# Patient Record
Sex: MALE | Race: OTHER | HISPANIC OR LATINO | URBAN - METROPOLITAN AREA
[De-identification: names, ages, dates, MRNs, and addresses within clinical notes are randomized per-mention and may not be internally consistent; named-entity substitution may affect disease eponyms.]

---

## 2018-07-18 ENCOUNTER — INPATIENT (INPATIENT)
Facility: HOSPITAL | Age: 23
LOS: 1 days | Discharge: HOME | DRG: 645 | End: 2018-07-20
Attending: INTERNAL MEDICINE | Admitting: HOSPITALIST
Payer: MEDICAID

## 2018-07-18 VITALS
OXYGEN SATURATION: 97 % | SYSTOLIC BLOOD PRESSURE: 124 MMHG | HEART RATE: 128 BPM | TEMPERATURE: 99 F | DIASTOLIC BLOOD PRESSURE: 69 MMHG | RESPIRATION RATE: 19 BRPM

## 2018-07-18 DIAGNOSIS — R00.0 TACHYCARDIA, UNSPECIFIED: ICD-10-CM

## 2018-07-18 LAB
ALBUMIN SERPL ELPH-MCNC: 4.4 G/DL — SIGNIFICANT CHANGE UP (ref 3.3–5)
ALP SERPL-CCNC: 98 U/L — SIGNIFICANT CHANGE UP (ref 40–120)
ALT FLD-CCNC: 27 U/L — SIGNIFICANT CHANGE UP (ref 10–45)
ANION GAP SERPL CALC-SCNC: 14 MMOL/L — SIGNIFICANT CHANGE UP (ref 5–17)
AST SERPL-CCNC: 34 U/L — SIGNIFICANT CHANGE UP (ref 10–40)
BASOPHILS # BLD AUTO: 0 K/UL — SIGNIFICANT CHANGE UP (ref 0–0.2)
BASOPHILS NFR BLD AUTO: 0.4 % — SIGNIFICANT CHANGE UP (ref 0–2)
BILIRUB SERPL-MCNC: 0.5 MG/DL — SIGNIFICANT CHANGE UP (ref 0.2–1.2)
BUN SERPL-MCNC: 8 MG/DL — SIGNIFICANT CHANGE UP (ref 7–23)
CALCIUM SERPL-MCNC: 10.1 MG/DL — SIGNIFICANT CHANGE UP (ref 8.4–10.5)
CHLORIDE SERPL-SCNC: 102 MMOL/L — SIGNIFICANT CHANGE UP (ref 96–108)
CO2 SERPL-SCNC: 24 MMOL/L — SIGNIFICANT CHANGE UP (ref 22–31)
CREAT SERPL-MCNC: 0.72 MG/DL — SIGNIFICANT CHANGE UP (ref 0.5–1.3)
EOSINOPHIL # BLD AUTO: 0.1 K/UL — SIGNIFICANT CHANGE UP (ref 0–0.5)
EOSINOPHIL NFR BLD AUTO: 1.3 % — SIGNIFICANT CHANGE UP (ref 0–6)
GLUCOSE SERPL-MCNC: 100 MG/DL — HIGH (ref 70–99)
HCT VFR BLD CALC: 43.8 % — SIGNIFICANT CHANGE UP (ref 39–50)
HGB BLD-MCNC: 15 G/DL — SIGNIFICANT CHANGE UP (ref 13–17)
LYMPHOCYTES # BLD AUTO: 2.6 K/UL — SIGNIFICANT CHANGE UP (ref 1–3.3)
LYMPHOCYTES # BLD AUTO: 37.5 % — SIGNIFICANT CHANGE UP (ref 13–44)
MCHC RBC-ENTMCNC: 28.8 PG — SIGNIFICANT CHANGE UP (ref 27–34)
MCHC RBC-ENTMCNC: 34.3 GM/DL — SIGNIFICANT CHANGE UP (ref 32–36)
MCV RBC AUTO: 84 FL — SIGNIFICANT CHANGE UP (ref 80–100)
MONOCYTES # BLD AUTO: 0.5 K/UL — SIGNIFICANT CHANGE UP (ref 0–0.9)
MONOCYTES NFR BLD AUTO: 7.5 % — SIGNIFICANT CHANGE UP (ref 2–14)
NEUTROPHILS # BLD AUTO: 3.8 K/UL — SIGNIFICANT CHANGE UP (ref 1.8–7.4)
NEUTROPHILS NFR BLD AUTO: 53.4 % — SIGNIFICANT CHANGE UP (ref 43–77)
PLAT MORPH BLD: NORMAL — SIGNIFICANT CHANGE UP
PLATELET # BLD AUTO: 281 K/UL — SIGNIFICANT CHANGE UP (ref 150–400)
POTASSIUM SERPL-MCNC: 4.5 MMOL/L — SIGNIFICANT CHANGE UP (ref 3.5–5.3)
POTASSIUM SERPL-SCNC: 4.5 MMOL/L — SIGNIFICANT CHANGE UP (ref 3.5–5.3)
PROT SERPL-MCNC: 7.7 G/DL — SIGNIFICANT CHANGE UP (ref 6–8.3)
RBC # BLD: 5.21 M/UL — SIGNIFICANT CHANGE UP (ref 4.2–5.8)
RBC # FLD: 11.6 % — SIGNIFICANT CHANGE UP (ref 10.3–14.5)
RBC BLD AUTO: SIGNIFICANT CHANGE UP
SODIUM SERPL-SCNC: 140 MMOL/L — SIGNIFICANT CHANGE UP (ref 135–145)
TROPONIN T, HIGH SENSITIVITY RESULT: <6 NG/L — SIGNIFICANT CHANGE UP (ref 0–51)
WBC # BLD: 7.1 K/UL — SIGNIFICANT CHANGE UP (ref 3.8–10.5)
WBC # FLD AUTO: 7.1 K/UL — SIGNIFICANT CHANGE UP (ref 3.8–10.5)

## 2018-07-18 PROCEDURE — 99223 1ST HOSP IP/OBS HIGH 75: CPT

## 2018-07-18 PROCEDURE — 71046 X-RAY EXAM CHEST 2 VIEWS: CPT | Mod: 26

## 2018-07-18 PROCEDURE — 93010 ELECTROCARDIOGRAM REPORT: CPT

## 2018-07-18 PROCEDURE — 99285 EMERGENCY DEPT VISIT HI MDM: CPT | Mod: 25

## 2018-07-18 RX ORDER — SODIUM CHLORIDE 9 MG/ML
1000 INJECTION INTRAMUSCULAR; INTRAVENOUS; SUBCUTANEOUS ONCE
Qty: 0 | Refills: 0 | Status: COMPLETED | OUTPATIENT
Start: 2018-07-18 | End: 2018-07-18

## 2018-07-18 RX ORDER — ATENOLOL 25 MG/1
25 TABLET ORAL ONCE
Qty: 0 | Refills: 0 | Status: COMPLETED | OUTPATIENT
Start: 2018-07-18 | End: 2018-07-18

## 2018-07-18 RX ADMIN — SODIUM CHLORIDE 2000 MILLILITER(S): 9 INJECTION INTRAMUSCULAR; INTRAVENOUS; SUBCUTANEOUS at 17:30

## 2018-07-18 RX ADMIN — ATENOLOL 25 MILLIGRAM(S): 25 TABLET ORAL at 17:30

## 2018-07-18 RX ADMIN — SODIUM CHLORIDE 2000 MILLILITER(S): 9 INJECTION INTRAMUSCULAR; INTRAVENOUS; SUBCUTANEOUS at 14:45

## 2018-07-18 NOTE — H&P ADULT - ASSESSMENT
This patient is a 23yoM with no significant past medical history who presents to the ED with complaint of elevated heart rate and palpitations. Patient states that he has experienced intermittent palpitations with elevated heart rate and cramping chest pain since December 2017. He also complained of sweats, heat and cold intolerance, diarrhea, and R eye pain and throat discomfort. Patient had been hospitalized at [facility] in Jan 2018 and was told he had hyperthyroidism. He was hospitalized again 2.5 weeks prior to admission at [facility]. Patient states that he was discharged on medications, which he continued to take at home, without improvement in his symptoms. He also endorses 35 lbs of unintentional weight loss since Jan 2018. Patient decided to visit ED today because he woke up with palpitations and chest pain. He had an episode of nonbloody, nonbilious emesis today. He missed  his visit with his PMD today.     In the ED, T 98.6, , /69, RR 19, SpO2 97%RA  Patient was given 2L IVNS, atenolol 25mg PO x1. This patient is a 23yoM with no significant past medical history who presents to the ED with complaint of elevated heart rate and palpitations, likely 2/2 hyperthyroidism.

## 2018-07-18 NOTE — ED ADULT NURSE REASSESSMENT NOTE - NS ED NURSE REASSESS COMMENT FT1
report received from prior RN. pt resting, appears comfortable, states he feels better than upon arrival, still has some "fluttering" feeling in his chest. aware of plan for CDU. friend at bedside. VSS. cardiac monitor not on, reattached, sinus tachycardia noted.
pt remains tachycardiac, MD aware. awaiting CDU consult.

## 2018-07-18 NOTE — H&P ADULT - HISTORY OF PRESENT ILLNESS
In the ED, T 98.6, , /69, RR 19, SpO2 97%RA  Patient was given 2L IVNS, atenolol 25mg PO x1. This patient is a 23yoM with no significant past medical history who presents to the ED with complaint of elevated heart rate and palpitations. Patient states that he has experienced intermittent palpitations with elevated heart rate and cramping chest pain since December 2017. He also complained of sweats, heat and cold intolerance, diarrhea, and R eye pain and throat discomfort. Patient had been hospitalized at [facility] in Jan 2018 and was told he had hyperthyroidism. He was hospitalized again 2.5 weeks prior to admission at [facility]. Patient states that he was discharged on medications, which he continued to take at home, without improvement in his symptoms. He also endorses 35 lbs of unintentional weight loss since Jan 2018. Patient decided to visit ED today because he woke up with palpitations and chest pain. He had an episode of nonbloody, nonbilious emesis today. He missed  his visit with his PMD today.     In the ED, T 98.6, , /69, RR 19, SpO2 97%RA  Patient was given 2L IVNS, atenolol 25mg PO x1. This patient is a 23yoM with no significant past medical history who presents to the ED with complaint of elevated heart rate and palpitations. Patient states that he has experienced intermittent palpitations with elevated heart rate and cramping chest pain since December 2017. He also complained of sweats, heat and cold intolerance, diarrhea, and R eye pain and throat discomfort. Patient had been hospitalized at [facility] in Jan 2018 and was told he had hyperthyroidism. He was hospitalized again 2.5 weeks prior to admission at [facility]. Patient states that he was discharged on medications, which he continued to take at home, without improvement in his symptoms. He also endorses 35 lbs of unintentional weight loss since Jan 2018. Patient decided to visit ED today because he woke up with palpitations and chest pain. He had an episode of nonbloody, nonbilious emesis today. He missed  his visit with his PMD today.     In the ED, T 98.6, , /69, RR 19, SpO2 97%RA  Patient was given 2L IVNS, atenolol 25mg PO x1.   Per  ED note, patient was diagnosed with hyperthyroidism at [facility]. This patient is a 23yoM with no significant past medical history who presents to the ED with complaint of elevated heart rate and palpitations. Patient states that he has experienced intermittent palpitations with elevated heart rate and cramping chest pain since December 2017. He also complained of sweats, heat and cold intolerance, diarrhea, and R eye pain and throat discomfort. Patient had been hospitalized at [facility] in Jan 2018 and was told he had hyperthyroidism. He was hospitalized again 2.5 weeks prior to admission at [facility]. Patient states that he was discharged on medications, which he continued to take at home, without improvement in his symptoms. He also endorses 35 lbs of unintentional weight loss since Jan 2018. Patient decided to visit ED today because he woke up with palpitations and chest pain. He had an episode of nonbloody, nonbilious emesis today. He missed  his visit with his PMD today.     In the ED, T 98.6, , /69, RR 19, SpO2 97%RA  Patient was given 2L IVNS, atenolol 25mg PO x1.   Per  ED note, patient was diagnosed with hyperthyroidism at [facility]. ED note also states that patient had recently been admitted to rehab for codeine/promethazine use however he denies illicit drug use on my encounter.

## 2018-07-18 NOTE — H&P ADULT - NSHPREVIEWOFSYSTEMS_GEN_ALL_CORE
REVIEW OF SYSTEMS  CONSTITUTIONAL: No fever, + chills, + fatigue  EYES: No eye pain, R eye pain and sensation of foreign body  ENMT:  No difficulty hearing, + throat discomfort  RESPIRATORY: + mild cough, no wheezing, no sputum production; No shortness of breath  CARDIOVASCULAR: + chest pain, + palpitations, no leg swelling  GASTROINTESTINAL: No abdominal pain, + nausea, + vomiting, no hematemesis, + diarrhea, no constipation,  no hematochezia, + black stool  GENITOURINARY: No dysuria, no hematuria  NEUROLOGICAL: No headaches, no loss of strength, no numbness  SKIN: No itching, no rashes, no lesions   PSYCH: complains of anxiety  HEME/LYMPH: No easy bruising, bleeding

## 2018-07-18 NOTE — ED PROVIDER NOTE - NOTES
Paged Paged. Will f/u if admitted. May need to titrate beta blocker, +/- methimazole. Will see the patient this week.

## 2018-07-18 NOTE — H&P ADULT - NSHPLABSRESULTS_GEN_ALL_CORE
Labs and imaging personally reviewed by me.     LABS:                        15.0   7.1   )-----------( 281      ( 18 Jul 2018 14:46 )             43.8     Hgb Trend: 15.0<--  07-18    140  |  102  |  8   ----------------------------<  100<H>  4.5   |  24  |  0.72    Ca    10.1      18 Jul 2018 14:46    TPro  7.7  /  Alb  4.4  /  TBili  0.5  /  DBili  x   /  AST  34  /  ALT  27  /  AlkPhos  98  07-18    Creatinine Trend: 0.72<--    EKG:  Labs notable for CBC WNL. CMP is WNL, with slightly elevated glucose of 100. Serum CK is elevated at 800. Troponin T is < 6. Urine drug toxicology screen is negative.    Imaging: CXR found clear lungs bilaterally

## 2018-07-18 NOTE — H&P ADULT - PROBLEM SELECTOR PLAN 2
Patient endorses palpitations, diarrhea, sweats, and heat and cold intolerance, which are symptoms consistent with hyperthyroidism  - Will check TSH, free T4, T3, thyroid receptor antibody, anti-TPO  radioactive thyroid uptake   - start propranolol  - start Patient endorses palpitations, diarrhea, sweats, and heat and cold intolerance, which are symptoms consistent with hyperthyroidism  - Will check TSH, free T4, T3, thyroid receptor antibody, anti-TPO  radioactive thyroid uptake   - continue atenolol and uptitrate to 50mg PO qd , with hold parameters  - start This patient endorses chest pain. His initial troponin value was < 6, and CK was elevated at 800.   This patient has a HEART score of 2 based on history of smoking and chest pain.   Will repeat cardiac enzyme, however low suspicion of cardiac cause of chest pain, given chronicity and duration of discomfort and lack of association with exertion.

## 2018-07-18 NOTE — ED PROVIDER NOTE - PROGRESS NOTE DETAILS
MD Olga Lidia,Attending: after multiple calls--found that pt admitted to [facility] for Hyperthyroidism. Will rx beta blockers here and have recechk by PMD Friday--we spoke with her as well. DC with Rx for metoprolol. Will talk with and refer pt to endosrine team Endo made aware. Rec'd Metoprolol 12.5 BID. Will see the patient in the morning. Order Thyroid panel. Will determine Methamizole in AM. MD Olga Lidia,Attending: refused by CDU because of diarrhea --will admit to Medicine service

## 2018-07-18 NOTE — H&P ADULT - PROBLEM SELECTOR PLAN 3
- Medication reconciliation required in AM since patient is unable to state medications that he takes. He notes that the medications were prescribed at [facility]. Patient complains of having black stool and diarrhea  Patient has normal H/H and denies have BRBPR.  Will check stool guaiac.

## 2018-07-18 NOTE — ED PROVIDER NOTE - CARE PLAN
Principal Discharge DX:	Sinus tachycardia by electrocardiogram  Secondary Diagnosis:	Palpitations Principal Discharge DX:	Sinus tachycardia by electrocardiogram  Secondary Diagnosis:	Palpitations  Secondary Diagnosis:	Hyperthyroidism

## 2018-07-18 NOTE — ED CDU PROVIDER INITIAL DAY NOTE - MEDICAL DECISION MAKING DETAILS
MD Olga Lidia,Attending: pt seen. agree with above HPI/ROS/PE. just  discharge for rehab and medically cleared --developed palpitations and noted to be tachycardic per EMS. Sinus Tachycardic 120-130. for cardiac workup and IVFs--no PE or ACS risk factors. TRy to obtain records form recent visit to [facility] where told he had a "heart condition" and prescribed medication which he states was withheld at rehab. Report obtained later in visit form [place]--pt admitted for Hyperthyroidism--unknown if/what medications started. When UDS cmae back negative-pt styarted on po atenolol and Endocrine called. They requested admission to CDU for monitoring response to atenolol/await TSH results and will consult in am wrt to starting anti-Thyroid meds.

## 2018-07-18 NOTE — H&P ADULT - NSHPSOCIALHISTORY_GEN_ALL_CORE
Patient lives with his parents.   He denies use of alcohol or illicit drugs.  He states that he uses cigarettes, and vaping daily.

## 2018-07-18 NOTE — ED PROVIDER NOTE - MEDICAL DECISION MAKING DETAILS
MD Olga Lidia,Attending: pt seen. agree with above HPI/ROS/PE. just  discharge for rehab and medically cleared --developed palpitations and noted to be tachycardic per EMS. Sinus Tachycardic . for cardiac workup and IVFs--no PE or ACS risk factors. Likely DC after eval for OP followup MD Olga Lidia,Attending: pt seen. agree with above HPI/ROS/PE. just  discharge for rehab and medically cleared --developed palpitations and noted to be tachycardic per EMS. Sinus Tachycardic 120-130. for cardiac workup and IVFs--no PE or ACS risk factors. TRy to obtain records form recent visit to [facility] where told he had a "heart condition" and prescribed medication which he states was withheld at rehab. Likely DC after eval for OP followup MD Olga Lidia,Attending: pt seen. agree with above HPI/ROS/PE. just  discharge for rehab and medically cleared --developed palpitations and noted to be tachycardic per EMS. Sinus Tachycardic 120-130. for cardiac workup and IVFs--no PE or ACS risk factors. TRy to obtain records form recent visit to [facility] where told he had a "heart condition" and prescribed medication which he states was withheld at rehab. Report obtained later in visit form [place]--pt admitted for Hyperthyroidism--unknown if/what medications started. When UDS cmae back negative-pt styarted on po atenolol and Endocrine called. They requested admission to CDU for monitoring response to atenolol/await TSH results and will consult in am wrt to starting anti-Thyroid meds.

## 2018-07-18 NOTE — H&P ADULT - NSHPPHYSICALEXAM_GEN_ALL_CORE
T(C): 37 (07-18-18 @ 14:04), Max: 37 (07-18-18 @ 14:04)  HR: 111 (07-18-18 @ 22:54) (108 - 128)  BP: 113/57 (07-18-18 @ 22:54) (104/67 - 124/69)  RR: 16 (07-18-18 @ 22:54) (14 - 19)  SpO2: 98% (07-18-18 @ 22:54) (97% - 100%)  Wt(kg): --Vital Signs Last 24 Hrs  T(C): 37 (18 Jul 2018 14:04), Max: 37 (18 Jul 2018 14:04)  T(F): 98.6 (18 Jul 2018 14:04), Max: 98.6 (18 Jul 2018 14:04)  HR: 111 (18 Jul 2018 22:54) (108 - 128)  BP: 113/57 (18 Jul 2018 22:54) (104/67 - 124/69)  BP(mean): --  RR: 16 (18 Jul 2018 22:54) (14 - 19)  SpO2: 98% (18 Jul 2018 22:54) (97% - 100%)    PHYSICAL EXAM:  GENERAL: NAD, well-groomed, well-developed  HEAD:  Atraumatic, Normocephalic  EYES: EOMI, PERRLA, conjunctiva and sclera clear  ENMT: No oropharyngeal exudates, erythema or lesions,  Moist mucous membranes, good dentition,   NECK: Supple, no cervical lymphadenopathy, enlarged thyroid  NERVOUS SYSTEM:  Alert & Oriented X3, CN II-XII intact, 5/5 BUE and BLE motor strength, full sensation to light touch   CHEST/LUNG: Clear to percussion bilaterally; No rales, no  rhonchi, no wheezing  HEART: elevated heart rate, normal rhythm; No murmurs, rubs, or gallops  ABDOMEN: Soft, Nontender, Nondistended  EXTREMITIES:  2+ Peripheral Pulses, No clubbing, cyanosis, or edema  LYMPH: No lymphadenopathy noted  SKIN: multiple tattoos, R big toe nail ingrown, without exudate or erythema  PSYCH: calm, pleasant, appropriate thought process

## 2018-07-18 NOTE — ED ADULT NURSE NOTE - OBJECTIVE STATEMENT
22 y/o male with PMH April presenting to ED for palpitations x 1 week. 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 Pt was at rehab for 2 weeks when he started experiencing palpiatations and was then transported to [facility] for an evaluation but no one knows the outcome."   Pt states "The doctors gave me a medication for my fast heart rate but I don't remember what it's called. I have not been taking it. It feels like a cramp on the middle of my chest. It can get a little better if I lay down. The cramping feeling goes to my leg." Pt unable to give history/ outcome from [facility] visit. Upon exam, pt. A&Ox3, lungs cta bilaterally, no difficulty speaking in complete sentences, s1s2 heart sounds heard, skin intact, placed on cardiac monitor sinu tachy in the 120s. EKG done and givent to MD. pt. denies chest pain, sob, ha, n/v/d, abdominal pain, f/c, urinary symptoms, hematuria. Labs drawn & sent.

## 2018-07-18 NOTE — H&P ADULT - PROBLEM SELECTOR PLAN 4
- IMPROVE score of 0  Encourage ambulation as tolerated - Medication reconciliation required in AM since patient is unable to state medications that he takes. He notes that the medications were prescribed at [facility].

## 2018-07-19 DIAGNOSIS — K92.1 MELENA: ICD-10-CM

## 2018-07-19 DIAGNOSIS — E05.90 THYROTOXICOSIS, UNSPECIFIED WITHOUT THYROTOXIC CRISIS OR STORM: ICD-10-CM

## 2018-07-19 DIAGNOSIS — R07.9 CHEST PAIN, UNSPECIFIED: ICD-10-CM

## 2018-07-19 DIAGNOSIS — R00.0 TACHYCARDIA, UNSPECIFIED: ICD-10-CM

## 2018-07-19 DIAGNOSIS — R00.2 PALPITATIONS: ICD-10-CM

## 2018-07-19 DIAGNOSIS — Z29.9 ENCOUNTER FOR PROPHYLACTIC MEASURES, UNSPECIFIED: ICD-10-CM

## 2018-07-19 DIAGNOSIS — Z79.899 OTHER LONG TERM (CURRENT) DRUG THERAPY: ICD-10-CM

## 2018-07-19 LAB
T3 SERPL-MCNC: 354 NG/DL — HIGH (ref 80–200)
T4 FREE SERPL-MCNC: 3 NG/DL — HIGH (ref 0.9–1.8)
THYROPEROXIDASE AB SERPL-ACNC: 738 IU/ML — HIGH (ref 0–34.9)
TROPONIN T, HIGH SENSITIVITY RESULT: <6 NG/L — SIGNIFICANT CHANGE UP (ref 0–51)
TSH SERPL-MCNC: 0.01 UIU/ML — LOW (ref 0.27–4.2)
TSH SERPL-MCNC: 0.01 UIU/ML — LOW (ref 0.27–4.2)

## 2018-07-19 PROCEDURE — 99222 1ST HOSP IP/OBS MODERATE 55: CPT | Mod: GC

## 2018-07-19 PROCEDURE — 93010 ELECTROCARDIOGRAM REPORT: CPT

## 2018-07-19 RX ORDER — ATENOLOL 25 MG/1
50 TABLET ORAL DAILY
Qty: 0 | Refills: 0 | Status: DISCONTINUED | OUTPATIENT
Start: 2018-07-19 | End: 2018-07-19

## 2018-07-19 RX ORDER — ACETAMINOPHEN 500 MG
650 TABLET ORAL ONCE
Qty: 0 | Refills: 0 | Status: COMPLETED | OUTPATIENT
Start: 2018-07-19 | End: 2018-07-19

## 2018-07-19 RX ORDER — METOPROLOL TARTRATE 50 MG
12.5 TABLET ORAL EVERY 12 HOURS
Qty: 0 | Refills: 0 | Status: DISCONTINUED | OUTPATIENT
Start: 2018-07-19 | End: 2018-07-20

## 2018-07-19 RX ADMIN — Medication 650 MILLIGRAM(S): at 22:27

## 2018-07-19 RX ADMIN — Medication 650 MILLIGRAM(S): at 23:10

## 2018-07-19 RX ADMIN — Medication 12.5 MILLIGRAM(S): at 18:21

## 2018-07-19 RX ADMIN — Medication 12.5 MILLIGRAM(S): at 01:38

## 2018-07-19 NOTE — PROGRESS NOTE ADULT - SUBJECTIVE AND OBJECTIVE BOX
Patient is a 23y old  Male who presents with a chief complaint of fast heart rate and palpitations (18 Jul 2018 23:38)      SUBJECTIVE / OVERNIGHT EVENTS:   Feels better.  Denies CP/SOB/Palpitation/HA.    MEDICATIONS  (STANDING):  ibuprofen  Tablet 400 milliGRAM(s) Oral once  methimazole 20 milliGRAM(s) Oral daily  metoprolol tartrate 12.5 milliGRAM(s) Oral every 12 hours    MEDICATIONS  (PRN):        CAPILLARY BLOOD GLUCOSE        I&O's Summary    18 Jul 2018 07:01  -  19 Jul 2018 07:00  --------------------------------------------------------  IN: 240 mL / OUT: 0 mL / NET: 240 mL    19 Jul 2018 07:01  -  20 Jul 2018 00:10  --------------------------------------------------------  IN: 480 mL / OUT: 0 mL / NET: 480 mL        PHYSICAL EXAM:  GENERAL: NAD, well-developed  HEAD:  Atraumatic, Normocephalic  NECK: Supple, No JVD  CHEST/LUNG: Clear to auscultation bilaterally; No wheezing.  HEART: Regular rate and rhythm; No murmurs, rubs, or gallops  ABDOMEN: Soft, Nontender, Nondistended; Bowel sounds present  EXTREMITIES:   No clubbing, cyanosis, or edema  NEUROLOGY: AAO X 3  SKIN: No rashes    LABS:                        15.0   7.1   )-----------( 281      ( 18 Jul 2018 14:46 )             43.8     07-18    140  |  102  |  8   ----------------------------<  100<H>  4.5   |  24  |  0.72    Ca    10.1      18 Jul 2018 14:46    TPro  7.7  /  Alb  4.4  /  TBili  0.5  /  DBili  x   /  AST  34  /  ALT  27  /  AlkPhos  98  07-18      CARDIAC MARKERS ( 18 Jul 2018 14:46 )  x     / x     / 800 U/L / x     / x            CAPILLARY BLOOD GLUCOSE                    RADIOLOGY & ADDITIONAL TESTS:    Imaging Personally Reviewed:    Consultant(s) Notes Reviewed:      Care Discussed with Consultants/Other Providers:

## 2018-07-19 NOTE — PROVIDER CONTACT NOTE (OTHER) - ASSESSMENT
Patient A&Ox4, VSS. Patient is tachycardic 100-110 on cardiac monitor. /71, oral temperature 98.3 degrees F, RR 17, O2 saturation 97% on room air. Patient complaining of left sided chest pain 8/10 cramping. Patient states pain has been constant over past 2 weeks. Patient states patient feeling mild palpitations. Patient states palpitations are constant and increase in intensity when heart rate rises.

## 2018-07-19 NOTE — CONSULT NOTE ADULT - PROBLEM SELECTOR RECOMMENDATION 9
-Patient with suppressed TSH, clinically hyperthyroid. Has been having symptoms for past few months.  -as per prelim recs- started on metoprolol 12.5 BID. Would uptitrate for goal HR 70s-90s.  -f/u on FT4, TSH receptor ab, and TSI. As per hx pt likely has Grave's disease, less likely thyroiditis.  -obtain US thyroid  -patient already ordered for radioactive uptake scan -Patient with suppressed TSH, clinically hyperthyroid. Has been having symptoms for past few months.  -as per prelim recs- started on metoprolol 12.5 BID. Would uptitrate for goal HR 70s-90s.  -f/u on FT4, TSH receptor ab, and TSI. As per hx pt likely has Grave's disease, less likely thyroiditis.  -obtain US thyroid  -patient already ordered for radioactive uptake scan. Would hold off on methimazole for now until uptake scan. -Patient with suppressed TSH, clinically hyperthyroid. Has been having symptoms for past few months.  -as per prelim recs- started on metoprolol 12.5 BID. Would uptitrate for goal HR 70s-90s.  -f/u on FT4, TSH receptor ab, and TSI. As per hx pt likely has Grave's disease, less likely thyroiditis.  -obtain US thyroid  -patient already ordered for radioactive uptake scan, but if not already performed will likely not happen until next week as the test needs to be done 2 consecutive days. Uptake and scan can be done as an outpatient. Start methimazole 20mg daily.

## 2018-07-19 NOTE — CHART NOTE - NSCHARTNOTEFT_GEN_A_CORE
MEDICINE NP    Notified by RN patient with chest pain 6/10. Seen and examined patient at bedside. Patient is alert, NAD. Patient endorses this is same chest pain he came with on admission and was worse yesterday, now requesting pain medication. Pain does not radiate. Denies HA, CP, SOB, cough, N/V, or abd pain.    VITAL SIGNS:  Vital Signs Last 24 Hrs  T(C): 37.4 (19 Jul 2018 20:44), Max: 37.4 (19 Jul 2018 20:44)  T(F): 99.4 (19 Jul 2018 20:44), Max: 99.4 (19 Jul 2018 20:44)  HR: 104 (19 Jul 2018 20:44) (72 - 120)  BP: 131/76 (19 Jul 2018 20:44) (105/61 - 140/71)  RR: 15 (19 Jul 2018 20:44) (15 - 18)  SpO2: 98% (19 Jul 2018 20:44) (97% - 98%)      LABORATORY:                          15.0   7.1   )-----------( 281      ( 18 Jul 2018 14:46 )             43.8       07-18    140  |  102  |  8   ----------------------------<  100<H>  4.5   |  24  |  0.72    Ca    10.1      18 Jul 2018 14:46    TPro  7.7  /  Alb  4.4  /  TBili  0.5  /  DBili  x   /  AST  34  /  ALT  27  /  AlkPhos  98  07-18      RADIOLOGY:  7/18 CXR- negative    PHYSICAL EXAM:  Constitutional: AOx3. NAD.  Respiratory: clear lungs bilaterally. No wheezing, rhonchi, or crackles.  Cardiovascular: S1 S2. No murmurs. mild tenderness to palpation   Gastrointestinal: BS X4 active. soft. nontender.  Extremities/Vascular: +2 pulses bilaterally. No BLE edema.    ASSESSMENT/PLAN:   This patient is a 23yoM with no significant past medical history who presents to the ED with complaint of elevated heart rate and palpitations. Patient states that he has experienced intermittent palpitations with elevated heart rate and cramping chest pain since December 2017. He also complained of sweats, heat and cold intolerance, diarrhea, and R eye pain and throat discomfort. Patient had been hospitalized at [facility] [place] [facility] in Jan 2018 and was told he had hyperthyroidism. He was hospitalized again 2.5 weeks prior to admission at [facility]. Patient states that he was discharged on medications, which he continued to take at home, without improvement in his symptoms. He also endorses 35 lbs of unintentional weight loss since Jan 2018. Patient decided to visit ED today because he woke up with palpitations and chest pain.  Now c/o recurrent chest pain. Previous cardiac enzymes negative, EKG ST.    Atypical chest pain  -Tylenol 650mg x1 MSK related CP  -EKG , no change from previous EKG  -CE x1 stat  -F/U primary team in AM    Danyell Guevara, [facility]  [id number] MEDICINE NP    Notified by RN patient with chest pain 6/10. Seen and examined patient at bedside. Patient is alert, NAD. Patient endorses this is same chest pain he came with on admission and was worse yesterday, now requesting pain medication. Pain does not radiate. Denies HA, SOB, cough, N/V, or abd pain.    VITAL SIGNS:  Vital Signs Last 24 Hrs  T(C): 37.4 (19 Jul 2018 20:44), Max: 37.4 (19 Jul 2018 20:44)  T(F): 99.4 (19 Jul 2018 20:44), Max: 99.4 (19 Jul 2018 20:44)  HR: 104 (19 Jul 2018 20:44) (72 - 120)  BP: 131/76 (19 Jul 2018 20:44) (105/61 - 140/71)  RR: 15 (19 Jul 2018 20:44) (15 - 18)  SpO2: 98% (19 Jul 2018 20:44) (97% - 98%)      LABORATORY:                          15.0   7.1   )-----------( 281      ( 18 Jul 2018 14:46 )             43.8       07-18    140  |  102  |  8   ----------------------------<  100<H>  4.5   |  24  |  0.72    Ca    10.1      18 Jul 2018 14:46    TPro  7.7  /  Alb  4.4  /  TBili  0.5  /  DBili  x   /  AST  34  /  ALT  27  /  AlkPhos  98  07-18      RADIOLOGY:  7/18 CXR- negative    PHYSICAL EXAM:  Constitutional: AOx3. NAD.  Respiratory: clear lungs bilaterally. No wheezing, rhonchi, or crackles.  Cardiovascular: S1 S2. No murmurs. mild tenderness to palpation   Gastrointestinal: BS X4 active. soft. nontender.  Extremities/Vascular: +2 pulses bilaterally. No BLE edema.    ASSESSMENT/PLAN:   This patient is a 23yoM with no significant past medical history who presents to the ED with complaint of elevated heart rate and palpitations. Patient states that he has experienced intermittent palpitations with elevated heart rate and cramping chest pain since December 2017. He also complained of sweats, heat and cold intolerance, diarrhea, and R eye pain and throat discomfort. Patient had been hospitalized at [facility] in Jan 2018 and was told he had hyperthyroidism. He was hospitalized again 2.5 weeks prior to admission at [facility]. Patient states that he was discharged on medications, which he continued to take at home, without improvement in his symptoms. He also endorses 35 lbs of unintentional weight loss since Jan 2018. Patient decided to visit ED today because he woke up with palpitations and chest pain.  Now c/o recurrent chest pain. Previous cardiac enzymes negative, EKG ST.    Atypical chest pain  -Tylenol 650mg x1 MSK pain  -EKG , no change from previous EKG  -CE x1 stat  -F/U primary team in [place] Guevara, [facility]  [id number]

## 2018-07-19 NOTE — CONSULT NOTE ADULT - SUBJECTIVE AND OBJECTIVE BOX
HPI:  This patient is a 23yoM with no significant past medical history who presents to the ED with complaint of elevated heart rate and palpitations. Patient states that he has experienced intermittent palpitations with elevated heart rate and cramping chest pain since December 2017. He also complained of sweats, heat and cold intolerance, diarrhea, and R eye pain and throat discomfort.     In the ED, T 98.6, , /69, RR 19, SpO2 97%RA  Patient was given 2L IVNS, atenolol 25mg PO x1.   Per  ED note, patient was diagnosed with hyperthyroidism at [facility]. ED note also states that patient had recently been admitted to rehab for codeine/promethazine use however he denies illicit drug use on my encounter. (18 Jul 2018 23:38)    Endocrine History:  Patient reports having symptoms for palpitations, tremors, diarrhea, N/V, heat intolerance since December 2017. Was hospitalized at [facility] as was w/u for cardiac etiologies. Reports left hospital prior to discharge and did not take any medications. Was in in [facility] 1 week ago for similar symptoms and was told he had hyperthyroidism and also left hospital prior to full evaluation of etiology, w/o any meds. Patient still reports same symptoms. Also with 35 lbs unintentional weightloss for past 3-4 months. Came to ED now with continued palpitations and chest pain. Not on any current medications. No family hx of hyperthyroidism. Denies any neck symptoms. Reports last viral URI was in April of this year.      PAST MEDICAL & SURGICAL HISTORY:  History of nail disorders  No significant past surgical history      FAMILY HISTORY:  No pertinent family history in first degree relatives      Social History: Smokes E-cigs. Denies alcohol or drug use    Outpatient Medications: None    MEDICATIONS  (STANDING):  metoprolol tartrate 12.5 milliGRAM(s) Oral every 12 hours    MEDICATIONS  (PRN):      Allergies    No Known Allergies    Intolerances      Review of Systems:  Constitutional: No fever  Eyes: No blurry vision  Neuro: Has tremors  HEENT: No pain  Cardiovascular: No chest pain,  Has palpitations  Respiratory: No SOB, no cough  GI: Has nausea, vomiting, No abdominal pain. Has diarrhea  : No dysuria  Skin: no rash  Endocrine: no polyuria, polydipsia. Has heat intolerance  Hem/lymph: no swelling    PHYSICAL EXAM:  VITALS: T(C): 36.4 (07-19-18 @ 12:23)  T(F): 97.5 (07-19-18 @ 12:23), Max: 98.5 (07-19-18 @ 05:05)  HR: 96 (07-19-18 @ 12:23) (72 - 121)  BP: 111/67 (07-19-18 @ 12:23) (104/67 - 122/71)  RR:  (14 - 18)  SpO2:  (97% - 100%)  Wt(kg): --  GENERAL: NAD, well-groomed, well-developed  EYES: No proptosis, no lid lag, anicteric  HEENT:  Atraumatic, Normocephalic, moist mucous membranes  THYROID: Full palpable thyroid, no discrete nodules  RESPIRATORY: Clear to auscultation bilaterally; No rales, rhonchi, wheezing  CARDIOVASCULAR: Regular rate and rhythm; No murmurs; no peripheral edema  GI: Soft, nontender, non distended, normal bowel sounds  SKIN: Dry, intact, No rashes or lesions  MUSCULOSKELETAL: Full range of motion, normal strength  NEURO: sensation intact, extraocular movements intact, mild resting tremor  PSYCH: Alert and oriented x 3, normal affect, normal mood  CUSHING'S SIGNS: no striae                              15.0   7.1   )-----------( 281      ( 18 Jul 2018 14:46 )             43.8       07-18    140  |  102  |  8   ----------------------------<  100<H>  4.5   |  24  |  0.72    EGFR if : 152  EGFR if non : 131    Ca    10.1      07-18    TPro  7.7  /  Alb  4.4  /  TBili  0.5  /  DBili  x   /  AST  34  /  ALT  27  /  AlkPhos  98  07-18      Thyroid Function Tests:  07-19 @ 08:29 TSH -- FreeT4 -- T3 -- Anti .0 Anti Thyroglobulin Ab -- TSI --  07-19 @ 05:04 TSH -- FreeT4 -- T3 354 Anti TPO -- Anti Thyroglobulin Ab -- TSI --

## 2018-07-19 NOTE — CONSULT NOTE ADULT - ATTENDING COMMENTS
Agree with assessment and plan as above by Dr. Josue. Reviewed all pertinent labs, glucose values, and imaging studies. Modifications made as indicated above.     Norberto Weinberg D.O  [phone]

## 2018-07-19 NOTE — PROVIDER CONTACT NOTE (OTHER) - ACTION/TREATMENT ORDERED:
Oxygen 2L NC applied. PA made aware. PA to assess patient. As per PA patient is comfortable with pain level at this time. No pain medications needed at this time. Continue to monitor patient.

## 2018-07-19 NOTE — CONSULT NOTE ADULT - ASSESSMENT
23 year old M with no significant PMH presents with persistent CP, palpitations. Also with N/V, diarrhea, heat intolerance, and weight loss. Found with suppressed TSH.

## 2018-07-20 ENCOUNTER — TRANSCRIPTION ENCOUNTER (OUTPATIENT)
Age: 23
End: 2018-07-20

## 2018-07-20 VITALS
DIASTOLIC BLOOD PRESSURE: 62 MMHG | RESPIRATION RATE: 16 BRPM | TEMPERATURE: 98 F | HEART RATE: 84 BPM | SYSTOLIC BLOOD PRESSURE: 131 MMHG | OXYGEN SATURATION: 99 %

## 2018-07-20 LAB
ANION GAP SERPL CALC-SCNC: 9 MMOL/L — SIGNIFICANT CHANGE UP (ref 5–17)
BUN SERPL-MCNC: 16 MG/DL — SIGNIFICANT CHANGE UP (ref 7–23)
CALCIUM SERPL-MCNC: 9.3 MG/DL — SIGNIFICANT CHANGE UP (ref 8.4–10.5)
CHLORIDE SERPL-SCNC: 109 MMOL/L — HIGH (ref 96–108)
CO2 SERPL-SCNC: 24 MMOL/L — SIGNIFICANT CHANGE UP (ref 22–31)
CREAT SERPL-MCNC: 0.73 MG/DL — SIGNIFICANT CHANGE UP (ref 0.5–1.3)
GLUCOSE SERPL-MCNC: 115 MG/DL — HIGH (ref 70–99)
HCT VFR BLD CALC: 38.7 % — LOW (ref 39–50)
HGB BLD-MCNC: 13.4 G/DL — SIGNIFICANT CHANGE UP (ref 13–17)
MCHC RBC-ENTMCNC: 29 PG — SIGNIFICANT CHANGE UP (ref 27–34)
MCHC RBC-ENTMCNC: 34.5 GM/DL — SIGNIFICANT CHANGE UP (ref 32–36)
MCV RBC AUTO: 84.1 FL — SIGNIFICANT CHANGE UP (ref 80–100)
PLATELET # BLD AUTO: 281 K/UL — SIGNIFICANT CHANGE UP (ref 150–400)
POTASSIUM SERPL-MCNC: 4.1 MMOL/L — SIGNIFICANT CHANGE UP (ref 3.5–5.3)
POTASSIUM SERPL-SCNC: 4.1 MMOL/L — SIGNIFICANT CHANGE UP (ref 3.5–5.3)
RBC # BLD: 4.6 M/UL — SIGNIFICANT CHANGE UP (ref 4.2–5.8)
RBC # FLD: 11.4 % — SIGNIFICANT CHANGE UP (ref 10.3–14.5)
SODIUM SERPL-SCNC: 142 MMOL/L — SIGNIFICANT CHANGE UP (ref 135–145)
T3FREE SERPL-MCNC: 20.48 PG/ML — HIGH (ref 1.8–4.6)
T4 FREE SERPL-MCNC: 3.8 NG/DL — HIGH (ref 0.9–1.8)
TROPONIN T, HIGH SENSITIVITY RESULT: <6 NG/L — SIGNIFICANT CHANGE UP (ref 0–51)
TSI ACT/NOR SER: 15.7 IU/L — HIGH (ref 0–0.55)
WBC # BLD: 8.3 K/UL — SIGNIFICANT CHANGE UP (ref 3.8–10.5)
WBC # FLD AUTO: 8.3 K/UL — SIGNIFICANT CHANGE UP (ref 3.8–10.5)

## 2018-07-20 PROCEDURE — 76536 US EXAM OF HEAD AND NECK: CPT | Mod: 26

## 2018-07-20 PROCEDURE — 78014 THYROID IMAGING W/BLOOD FLOW: CPT | Mod: 26

## 2018-07-20 PROCEDURE — 99232 SBSQ HOSP IP/OBS MODERATE 35: CPT | Mod: GC

## 2018-07-20 RX ORDER — METOPROLOL TARTRATE 50 MG
1 TABLET ORAL
Qty: 30 | Refills: 0 | OUTPATIENT
Start: 2018-07-20 | End: 2018-08-18

## 2018-07-20 RX ORDER — IBUPROFEN 200 MG
400 TABLET ORAL ONCE
Qty: 0 | Refills: 0 | Status: COMPLETED | OUTPATIENT
Start: 2018-07-20 | End: 2018-07-20

## 2018-07-20 RX ORDER — PROPRANOLOL HCL 160 MG
1 CAPSULE, EXTENDED RELEASE 24HR ORAL
Qty: 0 | Refills: 0 | COMMUNITY

## 2018-07-20 RX ORDER — METHIMAZOLE 10 MG/1
2 TABLET ORAL
Qty: 60 | Refills: 0 | OUTPATIENT
Start: 2018-07-20 | End: 2018-08-18

## 2018-07-20 RX ORDER — METOPROLOL TARTRATE 50 MG
25 TABLET ORAL DAILY
Qty: 0 | Refills: 0 | Status: DISCONTINUED | OUTPATIENT
Start: 2018-07-21 | End: 2018-07-20

## 2018-07-20 RX ORDER — PROMETHAZINE HCL/CODEINE
1.5 SYRUP ORAL
Qty: 0 | Refills: 0 | COMMUNITY

## 2018-07-20 RX ORDER — METHIMAZOLE 10 MG/1
1 TABLET ORAL
Qty: 0 | Refills: 0 | COMMUNITY

## 2018-07-20 RX ADMIN — Medication 400 MILLIGRAM(S): at 01:00

## 2018-07-20 RX ADMIN — Medication 400 MILLIGRAM(S): at 00:13

## 2018-07-20 RX ADMIN — Medication 12.5 MILLIGRAM(S): at 07:02

## 2018-07-20 RX ADMIN — Medication 12.5 MILLIGRAM(S): at 18:40

## 2018-07-20 NOTE — PROGRESS NOTE ADULT - ATTENDING COMMENTS
Agree with assessment and plan as above by Dr. Josue. Reviewed all pertinent labs, glucose values, and imaging studies. Modifications made as indicated above.     Norberto Weinberg D.O  [phone]

## 2018-07-20 NOTE — PROGRESS NOTE ADULT - ASSESSMENT
· Assessment	  This patient is a 23yoM with no significant past medical history who presents to the ED with complaint of elevated heart rate and palpitations, likely 2/2 hyperthyroidism.      Problem/Plan - 1:  ·  Problem: Hyperthyroidism.  Plan: Patient endorses palpitations, diarrhea, sweats, and heat and cold intolerance, which are symptoms consistent with hyperthyroidism  - metoprolol 12.5mg BID  Methimazole  - Endocrine eval noted.     Problem/Plan - 2:  ·  Problem: Chest pain.  Plan: Likely from hyperthyroidism.     Problem/Plan - 3:  ·  Problem: Black stool.  Plan: CBC.
23 year old M with no significant PMH presents with persistent CP, palpitations. Also with N/V, diarrhea, heat intolerance, and weight loss. Found with suppressed TSH.

## 2018-07-20 NOTE — PROGRESS NOTE ADULT - PROBLEM SELECTOR PLAN 1
-Patient with suppressed TSH, FT4 3.0. clinically hyperthyroid. Has been having symptoms for past few months.  -Had [facility] scan c/w Grave's disease. TSI elevated. TSH receptor still pending.   -c/w methimazole 20mg daily.  -HR better controlled with goal 70s-90s. Would switch to metoprolol ER 25mg qdaily.   -Patient needs close follow-up with endocrine for taper of methimazole and monitoring of TFTs.   -Can follow at [address] [place] [address] [phone] -Patient with suppressed TSH, FT4 3.0. clinically hyperthyroid. Has been having symptoms for past few months.  -Had [facility] scan c/w Grave's disease. TSI elevated. TSH receptor still pending.   -c/w methimazole 20mg daily.  -HR better controlled with goal 70s-90s. Would switch to metoprolol ER 25mg qdaily.   -Patient needs close follow-up with endocrine for taper of methimazole and monitoring of TFTs. Counseled patient on close f/u outpatient within next 2-3 weeks as well as medication adherence.  -Can follow at [address] [place] [address] [phone] -Patient with suppressed TSH, FT4 3.0. clinically hyperthyroid. Has been having symptoms for past few months.  -Had [facility] scan c/w Grave's disease. TSI elevated. TSH receptor still pending.   -c/w methimazole 20mg daily.  -HR better controlled with goal 70s-90s. Would switch to metoprolol ER 25mg qdaily.   -Patient needs close follow-up with endocrine for taper of methimazole and monitoring of TFTs. Counseled patient on close f/u outpatient within next 2-3 weeks as well as medication adherence.  -US showing Diffusely enlarged heterogeneous thyroid gland with diffuse hyperemia. Mildly enlarged lymph nodes in the cervical regions bilaterally, the   largest of which is a left level 2 lymph node measuring   2.2 x 1.2 x 1.4 cm. Outpatient f/u for repeat US and assessment for possible FNA    -Can follow at [address] [place] [address] [phone] -Patient with suppressed TSH, FT4 3.0. clinically hyperthyroid. Has been having symptoms for past few months.  -Had [facility] scan c/w Grave's disease. TSI elevated. TSH receptor still pending.   -c/w methimazole 20mg daily.  -HR better controlled with goal 70s-90s. Would switch to metoprolol ER 25mg qdaily.   -Patient needs close follow-up with endocrine for taper of methimazole and monitoring of TFTs. Counseled patient on close f/u outpatient within next 2-3 weeks as well as medication adherence.  -US showing Diffusely enlarged heterogeneous thyroid gland with diffuse hyperemia. Mildly enlarged lymph nodes in the cervical regions bilaterally, the   largest of which is a left level 2 lymph node measuring   2.2 x 1.2 x 1.4 cm. Outpatient f/u.    -Can follow at [address] [place] [address] [phone]  -Will sign off at this time.

## 2018-07-20 NOTE — DISCHARGE NOTE ADULT - HOSPITAL COURSE
23 year-old male, recently diagnosed with hyperthyroidism, non-compliant with medication presented with persistent CP, palpitations. Also with N/V, diarrhea, heat intolerance, and weight loss. Found with suppressed TSH.  US showing Diffusely enlarged heterogeneous thyroid gland with diffuse hyperemia. Mildly enlarged lymph nodes in the cervical regions bilaterally, the largest of which is a left level 2 lymph node measuring 2.2 x 1.2 x 1.4 cm.  Thyroid uptake scan consistent with hyperthyroidism, likely Graves' disease; Elevated 24-hour neck uptake of 69%.  Patient started on Methimazole and BB HR better controlled with goal 70s-90s. Patient cleared by Endocrinology with close follow-up with endocrine for taper of methimazole and monitoring of TFTs. Counseled patient on close f/u outpatient within next 2-3 weeks as well as medication adherence.  Patient is stable for discharge to home, will also follow up with your Primary Care Doctor within 1 week.

## 2018-07-20 NOTE — DISCHARGE NOTE ADULT - ADDITIONAL INSTRUCTIONS
Follow up with your Primary Care Doctor within 1 week.  Follow up with your Endocrinologist within 2 weeks.

## 2018-07-20 NOTE — PROVIDER CONTACT NOTE (OTHER) - ASSESSMENT
Patient A&Ox4. Sinus tachycardia HR 's on cardiac monitor, up to 130 when patient is walking. /82, RR 15, O2 saturation 99% on room air, oral temperature 98.4 degrees F. Patient complaining of left sided cramping chest pain 6/10. Patient states pain is non-radiating. Patient states pain has been constant over past two weeks. Patient is requesting pain medication.

## 2018-07-20 NOTE — PROGRESS NOTE ADULT - SUBJECTIVE AND OBJECTIVE BOX
Chief Complaint: Hyperthyroidism f/u    History:    MEDICATIONS  (STANDING):  methimazole 20 milliGRAM(s) Oral daily  metoprolol tartrate 12.5 milliGRAM(s) Oral every 12 hours    MEDICATIONS  (PRN):      Allergies    No Known Allergies    Intolerances    PHYSICAL EXAM:  VITALS: T(C): 36.7 (07-20-18 @ 11:56)  T(F): 98 (07-20-18 @ 11:56), Max: 99.4 (07-19-18 @ 20:44)  HR: 84 (07-20-18 @ 11:56) (84 - 120)  BP: 131/62 (07-20-18 @ 11:56) (103/64 - 140/71)  RR:  (15 - 17)  SpO2:  (98% - 99%)  Wt(kg): --  GENERAL: NAD, well-groomed, well-developed  EYES: No proptosis, no lid lag, anicteric  HEENT:  Atraumatic, Normocephalic, moist mucous membranes  RESPIRATORY: Clear to auscultation bilaterally; No rales, rhonchi, wheezing, or rubs  CARDIOVASCULAR: Regular rate and rhythm; No murmurs; no peripheral edema  GI: Soft, nontender, non distended, normal bowel sounds  SKIN: Dry, intact, No rashes or lesions  MUSCULOSKELETAL: Full range of motion, normal strength  PSYCH: Alert and oriented x 3, normal affect, normal mood      07-20    142  |  109<H>  |  16  ----------------------------<  115<H>  4.1   |  24  |  0.73    EGFR if : 152  EGFR if non : 131    Ca    9.3      07-20    TPro  7.7  /  Alb  4.4  /  TBili  0.5  /  DBili  x   /  AST  34  /  ALT  27  /  AlkPhos  98  07-18          Thyroid Function Tests:  07-19 @ 10:32 TSH -- FreeT4 -- T3 -- Anti TPO -- Anti Thyroglobulin Ab -- TSI 15.70  07-19 @ 08:29 TSH 0.01 FreeT4 3.0 T3 -- Anti .0 Anti Thyroglobulin Ab -- TSI -- Chief Complaint: Hyperthyroidism f/u    History: Patient reports feeling well. Still with some palpitations. No CP, SOB, abd pain, N/V.    MEDICATIONS  (STANDING):  methimazole 20 milliGRAM(s) Oral daily  metoprolol tartrate 12.5 milliGRAM(s) Oral every 12 hours    MEDICATIONS  (PRN):      Allergies    No Known Allergies    Intolerances    PHYSICAL EXAM:  VITALS: T(C): 36.7 (07-20-18 @ 11:56)  T(F): 98 (07-20-18 @ 11:56), Max: 99.4 (07-19-18 @ 20:44)  HR: 84 (07-20-18 @ 11:56) (84 - 120)  BP: 131/62 (07-20-18 @ 11:56) (103/64 - 140/71)  RR:  (15 - 17)  SpO2:  (98% - 99%)  Wt(kg): --  GENERAL: NAD, well-groomed, well-developed  EYES: No proptosis, no lid lag, anicteric  HEENT:  Atraumatic, Normocephalic, moist mucous membranes  RESPIRATORY: Clear to auscultation bilaterally; No rales, rhonchi, wheezing, or rubs  CARDIOVASCULAR: Regular rate and rhythm; No murmurs; no peripheral edema  GI: Soft, nontender, non distended, normal bowel sounds  SKIN: Dry, intact, No rashes or lesions  MUSCULOSKELETAL: Full range of motion, normal strength  PSYCH: Alert and oriented x 3, normal affect, normal mood      07-20    142  |  109<H>  |  16  ----------------------------<  115<H>  4.1   |  24  |  0.73    EGFR if : 152  EGFR if non : 131    Ca    9.3      07-20    TPro  7.7  /  Alb  4.4  /  TBili  0.5  /  DBili  x   /  AST  34  /  ALT  27  /  AlkPhos  98  07-18          Thyroid Function Tests:  07-19 @ 10:32 TSH -- FreeT4 -- T3 -- Anti TPO -- Anti Thyroglobulin Ab -- TSI 15.70  07-19 @ 08:29 TSH 0.01 FreeT4 3.0 T3 -- Anti .0 Anti Thyroglobulin Ab -- TSI --

## 2018-07-20 NOTE — DISCHARGE NOTE ADULT - PROVIDER TOKENS
FREE:[LAST:[Dr. Michele Claudette Reed],FIRST:[Primary Care Doctor],PHONE:[phone]],FAX:[phone]],ADDRESS:[[address] [place] [address]]],TOKEN:'[id number]'

## 2018-07-20 NOTE — DISCHARGE NOTE ADULT - MEDICATION SUMMARY - MEDICATIONS TO CHANGE
I will SWITCH the dose or number of times a day I take the medications listed below when I get home from the hospital:    methIMAzole 10 mg oral tablet  -- 1 tab(s) by mouth 3 times a day

## 2018-07-20 NOTE — PROVIDER CONTACT NOTE (OTHER) - ACTION/TREATMENT ORDERED:
NP made aware. NP to assess patient. Acetaminophen 650 mg PO ordered. 12 lead ecg ordered. High sensitivity troponin T ordered.   **Update @ 0003: no change in pain; Ibuprofen 400 mg PO ordered.

## 2018-07-20 NOTE — DISCHARGE NOTE ADULT - CARE PROVIDER_API CALL
Dr. Michele Claudette Reed, Primary Care Doctor  [address] [facility]   [address]   [place] [address]  Phone: [phone]  Fax: [phone]    Norberto Weinberg (DO), EndocrinologyMetabDiabetes; Internal Medicine  [address] [place] [id number]  Phone: [phone]  Fax: [phone]

## 2018-07-20 NOTE — DISCHARGE NOTE ADULT - CARE PROVIDERS DIRECT ADDRESSES
,DirectAddress_Unknown,[email or web address]

## 2018-07-20 NOTE — DISCHARGE NOTE ADULT - PLAN OF CARE
Patient will take prescribed medications and remain symptom-free Your body makes too much thyroid hormone.    Call your doctor if you feel more anxious, irritable, trouble sleeping, trembling, sweating a lot, fast heartbeat, tired, weight loss, diarrhea, abnormal periods  Your doctor will monitor thyroid blood work regularly to monitor levels  It is important to take medications prescribed  Take your medications as directed

## 2018-07-20 NOTE — DISCHARGE NOTE ADULT - PATIENT PORTAL LINK FT
You can access the [facility] Patient Portal, offered by [facility], by registering with the following website: [email or web address]

## 2018-07-20 NOTE — DISCHARGE NOTE ADULT - MEDICATION SUMMARY - MEDICATIONS TO TAKE
I will START or STAY ON the medications listed below when I get home from the hospital:    methIMAzole 10 mg oral tablet  -- 2 tab(s) by mouth once a day  -- Indication: For Hyperthyroidism    metoprolol succinate 25 mg oral tablet, extended release  -- 1 tab(s) by mouth once a day  -- Indication: For Hyperthyroidism

## 2018-07-20 NOTE — DISCHARGE NOTE ADULT - CARE PLAN
Principal Discharge DX:	Hyperthyroidism  Goal:	Patient will take prescribed medications and remain symptom-free  Assessment and plan of treatment:	Your body makes too much thyroid hormone.    Call your doctor if you feel more anxious, irritable, trouble sleeping, trembling, sweating a lot, fast heartbeat, tired, weight loss, diarrhea, abnormal periods  Your doctor will monitor thyroid blood work regularly to monitor levels  It is important to take medications prescribed  Take your medications as directed

## 2018-07-23 PROBLEM — Z87.2 PERSONAL HISTORY OF DISEASES OF THE SKIN AND SUBCUTANEOUS TISSUE: Chronic | Status: ACTIVE | Noted: 2018-07-19

## 2018-07-23 PROBLEM — Z00.00 ENCOUNTER FOR PREVENTIVE HEALTH EXAMINATION: Status: ACTIVE | Noted: 2018-07-23

## 2018-07-23 LAB — TSH RECEP AB FLD-ACNC: 18.36 IU/L — HIGH (ref 0–1.75)

## 2018-08-10 LAB
ALBUMIN SERPL ELPH-MCNC: 4.4 G/DL
ALP BLD-CCNC: 138 U/L
ALT SERPL-CCNC: 13 U/L
ANION GAP SERPL CALC-SCNC: 14 MMOL/L
AST SERPL-CCNC: 15 U/L
BILIRUB SERPL-MCNC: 0.3 MG/DL
BUN SERPL-MCNC: 9 MG/DL
CALCIUM SERPL-MCNC: 10.3 MG/DL
CHLORIDE SERPL-SCNC: 103 MMOL/L
CO2 SERPL-SCNC: 26 MMOL/L
CREAT SERPL-MCNC: 0.66 MG/DL
GLUCOSE SERPL-MCNC: 108 MG/DL
POTASSIUM SERPL-SCNC: 4.4 MMOL/L
PROT SERPL-MCNC: 7.6 G/DL
SODIUM SERPL-SCNC: 143 MMOL/L

## 2018-08-24 LAB
T4 FREE SERPL-MCNC: 3 NG/DL
TSH SERPL-ACNC: 0.01 UIU/ML

## 2018-09-14 ENCOUNTER — EMERGENCY (EMERGENCY)
Facility: HOSPITAL | Age: 23
LOS: 1 days | Discharge: HOME | End: 2018-09-14
Attending: EMERGENCY MEDICINE
Payer: MEDICAID

## 2018-09-14 VITALS
OXYGEN SATURATION: 97 % | TEMPERATURE: 98 F | WEIGHT: 184.97 LBS | HEART RATE: 97 BPM | DIASTOLIC BLOOD PRESSURE: 76 MMHG | SYSTOLIC BLOOD PRESSURE: 111 MMHG | RESPIRATION RATE: 16 BRPM

## 2018-09-14 VITALS
DIASTOLIC BLOOD PRESSURE: 756 MMHG | TEMPERATURE: 98 F | RESPIRATION RATE: 16 BRPM | HEART RATE: 103 BPM | OXYGEN SATURATION: 98 % | SYSTOLIC BLOOD PRESSURE: 97 MMHG

## 2018-09-14 LAB
ALBUMIN SERPL ELPH-MCNC: 3.7 G/DL — SIGNIFICANT CHANGE UP (ref 3.3–5)
ALP SERPL-CCNC: 102 U/L — SIGNIFICANT CHANGE UP (ref 40–120)
ALT FLD-CCNC: 14 U/L — SIGNIFICANT CHANGE UP (ref 10–45)
ANION GAP SERPL CALC-SCNC: 8 MMOL/L — SIGNIFICANT CHANGE UP (ref 5–17)
APAP SERPL-MCNC: <15 UG/ML — SIGNIFICANT CHANGE UP (ref 10–30)
AST SERPL-CCNC: 22 U/L — SIGNIFICANT CHANGE UP (ref 10–40)
BASOPHILS # BLD AUTO: 0.1 K/UL — SIGNIFICANT CHANGE UP (ref 0–0.2)
BASOPHILS NFR BLD AUTO: 1 % — SIGNIFICANT CHANGE UP (ref 0–2)
BILIRUB SERPL-MCNC: 0.2 MG/DL — SIGNIFICANT CHANGE UP (ref 0.2–1.2)
BUN SERPL-MCNC: 7 MG/DL — SIGNIFICANT CHANGE UP (ref 7–23)
CALCIUM SERPL-MCNC: 9.3 MG/DL — SIGNIFICANT CHANGE UP (ref 8.4–10.5)
CHLORIDE SERPL-SCNC: 102 MMOL/L — SIGNIFICANT CHANGE UP (ref 96–108)
CO2 SERPL-SCNC: 27 MMOL/L — SIGNIFICANT CHANGE UP (ref 22–31)
CREAT SERPL-MCNC: 0.69 MG/DL — SIGNIFICANT CHANGE UP (ref 0.5–1.3)
EOSINOPHIL # BLD AUTO: 0.5 K/UL — SIGNIFICANT CHANGE UP (ref 0–0.5)
EOSINOPHIL NFR BLD AUTO: 5.4 % — SIGNIFICANT CHANGE UP (ref 0–6)
GLUCOSE SERPL-MCNC: 130 MG/DL — HIGH (ref 70–99)
HCT VFR BLD CALC: 38.3 % — LOW (ref 39–50)
HGB BLD-MCNC: 12.7 G/DL — LOW (ref 13–17)
LYMPHOCYTES # BLD AUTO: 4 K/UL — HIGH (ref 1–3.3)
LYMPHOCYTES # BLD AUTO: 45 % — HIGH (ref 13–44)
MCHC RBC-ENTMCNC: 27.1 PG — SIGNIFICANT CHANGE UP (ref 27–34)
MCHC RBC-ENTMCNC: 33.3 GM/DL — SIGNIFICANT CHANGE UP (ref 32–36)
MCV RBC AUTO: 81.6 FL — SIGNIFICANT CHANGE UP (ref 80–100)
MONOCYTES # BLD AUTO: 0.7 K/UL — SIGNIFICANT CHANGE UP (ref 0–0.9)
MONOCYTES NFR BLD AUTO: 8.2 % — SIGNIFICANT CHANGE UP (ref 2–14)
NEUTROPHILS # BLD AUTO: 3.6 K/UL — SIGNIFICANT CHANGE UP (ref 1.8–7.4)
NEUTROPHILS NFR BLD AUTO: 40.5 % — LOW (ref 43–77)
PLATELET # BLD AUTO: 300 K/UL — SIGNIFICANT CHANGE UP (ref 150–400)
POTASSIUM SERPL-MCNC: 3.7 MMOL/L — SIGNIFICANT CHANGE UP (ref 3.5–5.3)
POTASSIUM SERPL-SCNC: 3.7 MMOL/L — SIGNIFICANT CHANGE UP (ref 3.5–5.3)
PROT SERPL-MCNC: 6.7 G/DL — SIGNIFICANT CHANGE UP (ref 6–8.3)
RBC # BLD: 4.69 M/UL — SIGNIFICANT CHANGE UP (ref 4.2–5.8)
RBC # FLD: 11.6 % — SIGNIFICANT CHANGE UP (ref 10.3–14.5)
SALICYLATES SERPL-MCNC: <2 MG/DL — LOW (ref 15–30)
SODIUM SERPL-SCNC: 137 MMOL/L — SIGNIFICANT CHANGE UP (ref 135–145)
WBC # BLD: 8.9 K/UL — SIGNIFICANT CHANGE UP (ref 3.8–10.5)
WBC # FLD AUTO: 8.9 K/UL — SIGNIFICANT CHANGE UP (ref 3.8–10.5)

## 2018-09-14 PROCEDURE — 99283 EMERGENCY DEPT VISIT LOW MDM: CPT

## 2018-09-14 PROCEDURE — 80053 COMPREHEN METABOLIC PANEL: CPT

## 2018-09-14 PROCEDURE — 80307 DRUG TEST PRSMV CHEM ANLYZR: CPT

## 2018-09-14 PROCEDURE — 93005 ELECTROCARDIOGRAM TRACING: CPT

## 2018-09-14 PROCEDURE — 85027 COMPLETE CBC AUTOMATED: CPT

## 2018-09-14 PROCEDURE — 99284 EMERGENCY DEPT VISIT MOD MDM: CPT | Mod: 25

## 2018-09-14 NOTE — ED PROVIDER NOTE - ATTENDING CONTRIBUTION TO CARE
Attending MD Whitlock:  I personally have seen and examined this patient.  Resident note reviewed and agree on plan of care and except where noted.  See MDM for details.

## 2018-09-14 NOTE — ED ADULT NURSE NOTE - NSIMPLEMENTINTERV_GEN_ALL_ED
Implemented All Fall Risk Interventions:  [place] to call system. Call bell, personal items and telephone within reach. Instruct patient to call for assistance. Room bathroom lighting operational. Non-slip footwear when patient is off stretcher. Physically safe environment: no spills, clutter or unnecessary equipment. Stretcher in lowest position, wheels locked, appropriate side rails in place. Provide visual cue, wrist band, yellow gown, etc. Monitor gait and stability. Monitor for mental status changes and reorient to person, place, and time. Review medications for side effects contributing to fall risk. Reinforce activity limits and safety measures with patient and family.

## 2018-09-14 NOTE — ED PROVIDER NOTE - PROGRESS NOTE DETAILS
awake and said hungry and asking for juice. still drowsy. Pyie; Pt's awake and alert and eating. No midline tenderness, no focal deficits. Pt is seen ambulating.
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
## 2018-09-18 ENCOUNTER — APPOINTMENT (OUTPATIENT)
Dept: ENDOCRINOLOGY | Facility: CLINIC | Age: 23
End: 2018-09-18
Payer: MEDICAID

## 2018-09-18 VITALS
HEIGHT: 66 IN | HEART RATE: 107 BPM | DIASTOLIC BLOOD PRESSURE: 80 MMHG | OXYGEN SATURATION: 97 % | SYSTOLIC BLOOD PRESSURE: 120 MMHG | BODY MASS INDEX: 31.02 KG/M2 | WEIGHT: 193 LBS

## 2018-09-18 DIAGNOSIS — Z78.9 OTHER SPECIFIED HEALTH STATUS: ICD-10-CM

## 2018-09-18 DIAGNOSIS — E05.00 THYROTOXICOSIS WITH DIFFUSE GOITER W/OUT THYROTOXIC CRISIS OR STORM: ICD-10-CM

## 2018-09-18 PROCEDURE — 99214 OFFICE O/P EST MOD 30 MIN: CPT

## 2018-09-18 RX ORDER — METHIMAZOLE 10 MG/1
10 TABLET ORAL
Qty: 90 | Refills: 3 | Status: ACTIVE | COMMUNITY
Start: 2018-09-18 | End: 1900-01-01

## 2018-09-18 RX ORDER — METOPROLOL TARTRATE 50 MG/1
50 TABLET, FILM COATED ORAL
Qty: 60 | Refills: 3 | Status: ACTIVE | COMMUNITY
Start: 2018-09-18 | End: 1900-01-01

## 2018-09-19 LAB — DRUG SCREEN, SERUM: SIGNIFICANT CHANGE UP

## 2018-11-20 ENCOUNTER — APPOINTMENT (OUTPATIENT)
Dept: ENDOCRINOLOGY | Facility: CLINIC | Age: 23
End: 2018-11-20

## 2018-12-26 PROCEDURE — 85027 COMPLETE CBC AUTOMATED: CPT

## 2018-12-26 PROCEDURE — 78014 THYROID IMAGING W/BLOOD FLOW: CPT

## 2018-12-26 PROCEDURE — 84480 ASSAY TRIIODOTHYRONINE (T3): CPT

## 2018-12-26 PROCEDURE — 84443 ASSAY THYROID STIM HORMONE: CPT

## 2018-12-26 PROCEDURE — 84445 ASSAY OF TSI GLOBULIN: CPT

## 2018-12-26 PROCEDURE — 84481 FREE ASSAY (FT-3): CPT

## 2018-12-26 PROCEDURE — 84439 ASSAY OF FREE THYROXINE: CPT

## 2018-12-26 PROCEDURE — 84484 ASSAY OF TROPONIN QUANT: CPT

## 2018-12-26 PROCEDURE — 71046 X-RAY EXAM CHEST 2 VIEWS: CPT

## 2018-12-26 PROCEDURE — 80307 DRUG TEST PRSMV CHEM ANLYZR: CPT

## 2018-12-26 PROCEDURE — 99285 EMERGENCY DEPT VISIT HI MDM: CPT

## 2018-12-26 PROCEDURE — 76536 US EXAM OF HEAD AND NECK: CPT

## 2018-12-26 PROCEDURE — 93005 ELECTROCARDIOGRAM TRACING: CPT

## 2018-12-26 PROCEDURE — 86376 MICROSOMAL ANTIBODY EACH: CPT

## 2018-12-26 PROCEDURE — 80048 BASIC METABOLIC PNL TOTAL CA: CPT

## 2018-12-26 PROCEDURE — 82550 ASSAY OF CK (CPK): CPT

## 2018-12-26 PROCEDURE — A9516: CPT

## 2018-12-26 PROCEDURE — 80053 COMPREHEN METABOLIC PANEL: CPT

## 2018-12-26 PROCEDURE — 83520 IMMUNOASSAY QUANT NOS NONAB: CPT

## 2019-12-23 NOTE — PATIENT PROFILE ADULT. - SURGICAL SITE INCISION
no Inflammation Suggestive Of Cancer Camouflage Histology Text: There was a dense lymphocytic infiltrate which prevented adequate histologic evaluation of adjacent structures.
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
## 2024-03-14 ENCOUNTER — OFFICE (OUTPATIENT)
Dept: URBAN - METROPOLITAN AREA CLINIC 35 | Facility: CLINIC | Age: 29
Setting detail: OPHTHALMOLOGY
End: 2024-03-14
Payer: MEDICAID

## 2024-03-14 DIAGNOSIS — H10.89: ICD-10-CM

## 2024-03-14 PROCEDURE — 92002 INTRM OPH EXAM NEW PATIENT: CPT | Performed by: OPHTHALMOLOGY

## 2024-03-20 ENCOUNTER — OFFICE (OUTPATIENT)
Dept: URBAN - METROPOLITAN AREA CLINIC 35 | Facility: CLINIC | Age: 29
Setting detail: OPHTHALMOLOGY
End: 2024-03-20
Payer: MEDICAID

## 2024-03-20 ENCOUNTER — RX ONLY (RX ONLY)
Age: 29
End: 2024-03-20

## 2024-03-20 DIAGNOSIS — H52.7: ICD-10-CM

## 2024-03-20 DIAGNOSIS — H10.89: ICD-10-CM

## 2024-03-20 DIAGNOSIS — H52.13: ICD-10-CM

## 2024-03-20 PROCEDURE — 92015 DETERMINE REFRACTIVE STATE: CPT | Performed by: OPHTHALMOLOGY

## 2024-03-20 PROCEDURE — 92012 INTRM OPH EXAM EST PATIENT: CPT | Performed by: OPHTHALMOLOGY

## 2024-03-20 ASSESSMENT — SPHEQUIV_DERIVED
OS_SPHEQUIV: -3.5
OD_SPHEQUIV: -2.625

## 2024-03-20 ASSESSMENT — REFRACTION_MANIFEST
OS_VA1: 20/25
OS_SPHERE: -3.75
OD_SPHERE: -3.25
OS_CYLINDER: +0.50
OS_AXIS: 080
OD_AXIS: 100
OD_CYLINDER: +1.25
OD_VA1: 20/20
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
